# Patient Record
Sex: FEMALE | Race: WHITE | NOT HISPANIC OR LATINO | Employment: OTHER | URBAN - METROPOLITAN AREA
[De-identification: names, ages, dates, MRNs, and addresses within clinical notes are randomized per-mention and may not be internally consistent; named-entity substitution may affect disease eponyms.]

---

## 2017-03-20 ASSESSMENT — MIFFLIN-ST. JEOR: SCORE: 858.66

## 2017-03-23 ENCOUNTER — ANESTHESIA - HEALTHEAST (OUTPATIENT)
Dept: SURGERY | Facility: CLINIC | Age: 82
End: 2017-03-23

## 2017-03-24 ENCOUNTER — SURGERY - HEALTHEAST (OUTPATIENT)
Dept: SURGERY | Facility: CLINIC | Age: 82
End: 2017-03-24

## 2017-03-24 ENCOUNTER — HOME CARE/HOSPICE - HEALTHEAST (OUTPATIENT)
Dept: HOME HEALTH SERVICES | Facility: HOME HEALTH | Age: 82
End: 2017-03-24

## 2017-03-24 ASSESSMENT — MIFFLIN-ST. JEOR: SCORE: 858.66

## 2017-04-13 ENCOUNTER — COMMUNICATION - HEALTHEAST (OUTPATIENT)
Dept: ADMINISTRATIVE | Facility: CLINIC | Age: 82
End: 2017-04-13

## 2017-04-20 ENCOUNTER — AMBULATORY - HEALTHEAST (OUTPATIENT)
Dept: NURSING | Facility: CLINIC | Age: 82
End: 2017-04-20

## 2017-04-20 DIAGNOSIS — M81.0 SENILE OSTEOPOROSIS: ICD-10-CM

## 2017-10-25 ENCOUNTER — OFFICE VISIT - HEALTHEAST (OUTPATIENT)
Dept: INTERNAL MEDICINE | Facility: CLINIC | Age: 82
End: 2017-10-25

## 2017-10-25 DIAGNOSIS — M81.0 SENILE OSTEOPOROSIS: ICD-10-CM

## 2017-10-25 RX ORDER — LISINOPRIL 20 MG/1
20 TABLET ORAL DAILY
Status: SHIPPED | COMMUNITY
Start: 2017-10-25

## 2018-04-20 ENCOUNTER — AMBULATORY - HEALTHEAST (OUTPATIENT)
Dept: INTERNAL MEDICINE | Facility: CLINIC | Age: 83
End: 2018-04-20

## 2018-04-20 DIAGNOSIS — M81.0 SENILE OSTEOPOROSIS: ICD-10-CM

## 2018-04-26 ENCOUNTER — AMBULATORY - HEALTHEAST (OUTPATIENT)
Dept: NURSING | Facility: CLINIC | Age: 83
End: 2018-04-26

## 2018-10-30 ENCOUNTER — OFFICE VISIT - HEALTHEAST (OUTPATIENT)
Dept: INTERNAL MEDICINE | Facility: CLINIC | Age: 83
End: 2018-10-30

## 2018-10-30 DIAGNOSIS — M81.0 SENILE OSTEOPOROSIS: ICD-10-CM

## 2018-11-16 ENCOUNTER — RECORDS - HEALTHEAST (OUTPATIENT)
Dept: ADMINISTRATIVE | Facility: OTHER | Age: 83
End: 2018-11-16

## 2018-11-16 ENCOUNTER — RECORDS - HEALTHEAST (OUTPATIENT)
Dept: BONE DENSITY | Facility: CLINIC | Age: 83
End: 2018-11-16

## 2018-11-16 DIAGNOSIS — M81.0 AGE-RELATED OSTEOPOROSIS WITHOUT CURRENT PATHOLOGICAL FRACTURE: ICD-10-CM

## 2018-12-07 ENCOUNTER — COMMUNICATION - HEALTHEAST (OUTPATIENT)
Dept: INTERNAL MEDICINE | Facility: CLINIC | Age: 83
End: 2018-12-07

## 2018-12-10 ENCOUNTER — COMMUNICATION - HEALTHEAST (OUTPATIENT)
Dept: ENDOCRINOLOGY | Facility: CLINIC | Age: 83
End: 2018-12-10

## 2019-04-30 ENCOUNTER — AMBULATORY - HEALTHEAST (OUTPATIENT)
Dept: NURSING | Facility: CLINIC | Age: 84
End: 2019-04-30

## 2019-04-30 ENCOUNTER — COMMUNICATION - HEALTHEAST (OUTPATIENT)
Dept: INTERNAL MEDICINE | Facility: CLINIC | Age: 84
End: 2019-04-30

## 2019-10-30 ENCOUNTER — RECORDS - HEALTHEAST (OUTPATIENT)
Dept: SCHEDULING | Facility: CLINIC | Age: 84
End: 2019-10-30

## 2019-10-31 ENCOUNTER — OFFICE VISIT - HEALTHEAST (OUTPATIENT)
Dept: INTERNAL MEDICINE | Facility: CLINIC | Age: 84
End: 2019-10-31

## 2019-10-31 DIAGNOSIS — M81.0 SENILE OSTEOPOROSIS: ICD-10-CM

## 2019-10-31 RX ORDER — AMLODIPINE BESYLATE 5 MG/1
5 TABLET ORAL DAILY
Status: SHIPPED | COMMUNITY
Start: 2019-07-11 | End: 2020-07-10

## 2020-02-12 ENCOUNTER — AMBULATORY - HEALTHEAST (OUTPATIENT)
Dept: INTERNAL MEDICINE | Facility: CLINIC | Age: 85
End: 2020-02-12

## 2020-02-12 DIAGNOSIS — M81.0 SENILE OSTEOPOROSIS: ICD-10-CM

## 2020-02-12 DIAGNOSIS — Z92.29 PERSONAL HISTORY OF OTHER DRUG THERAPY: ICD-10-CM

## 2020-05-01 ENCOUNTER — AMBULATORY - HEALTHEAST (OUTPATIENT)
Dept: NURSING | Facility: CLINIC | Age: 85
End: 2020-05-01

## 2020-10-23 ENCOUNTER — AMBULATORY - HEALTHEAST (OUTPATIENT)
Dept: INTERNAL MEDICINE | Facility: CLINIC | Age: 85
End: 2020-10-23

## 2020-10-23 DIAGNOSIS — Z92.29 PERSONAL HISTORY OF OTHER DRUG THERAPY: ICD-10-CM

## 2020-10-23 DIAGNOSIS — M81.0 SENILE OSTEOPOROSIS: ICD-10-CM

## 2020-11-03 ENCOUNTER — OFFICE VISIT - HEALTHEAST (OUTPATIENT)
Dept: INTERNAL MEDICINE | Facility: CLINIC | Age: 85
End: 2020-11-03

## 2020-11-03 DIAGNOSIS — M81.0 SENILE OSTEOPOROSIS: ICD-10-CM

## 2020-11-03 RX ORDER — AMLODIPINE BESYLATE 5 MG/1
5 TABLET ORAL
Status: SHIPPED | COMMUNITY
Start: 2020-07-14 | End: 2021-07-14

## 2020-11-04 ENCOUNTER — COMMUNICATION - HEALTHEAST (OUTPATIENT)
Dept: INTERNAL MEDICINE | Facility: CLINIC | Age: 85
End: 2020-11-04

## 2020-11-04 LAB — 25(OH)D3 SERPL-MCNC: 52.9 NG/ML (ref 30–80)

## 2021-04-26 ENCOUNTER — RECORDS - HEALTHEAST (OUTPATIENT)
Dept: BONE DENSITY | Facility: CLINIC | Age: 86
End: 2021-04-26

## 2021-04-26 ENCOUNTER — RECORDS - HEALTHEAST (OUTPATIENT)
Dept: ADMINISTRATIVE | Facility: OTHER | Age: 86
End: 2021-04-26

## 2021-04-26 DIAGNOSIS — M81.0 AGE-RELATED OSTEOPOROSIS WITHOUT CURRENT PATHOLOGICAL FRACTURE: ICD-10-CM

## 2021-05-03 ENCOUNTER — COMMUNICATION - HEALTHEAST (OUTPATIENT)
Dept: INTERNAL MEDICINE | Facility: CLINIC | Age: 86
End: 2021-05-03

## 2021-05-04 ENCOUNTER — COMMUNICATION - HEALTHEAST (OUTPATIENT)
Dept: INTERNAL MEDICINE | Facility: CLINIC | Age: 86
End: 2021-05-04

## 2021-05-04 ENCOUNTER — AMBULATORY - HEALTHEAST (OUTPATIENT)
Dept: NURSING | Facility: CLINIC | Age: 86
End: 2021-05-04

## 2021-05-25 ENCOUNTER — RECORDS - HEALTHEAST (OUTPATIENT)
Dept: ADMINISTRATIVE | Facility: CLINIC | Age: 86
End: 2021-05-25

## 2021-05-26 ENCOUNTER — RECORDS - HEALTHEAST (OUTPATIENT)
Dept: ADMINISTRATIVE | Facility: CLINIC | Age: 86
End: 2021-05-26

## 2021-05-28 ENCOUNTER — RECORDS - HEALTHEAST (OUTPATIENT)
Dept: ADMINISTRATIVE | Facility: CLINIC | Age: 86
End: 2021-05-28

## 2021-05-28 NOTE — PROGRESS NOTES
"Prolia Injection Phone Screen      Screening questions have been asked 2-3 days prior to administration visit for Prolia. If any questions are answered with \"Yes,\" this phone encounter were will routed to ordering provider for further evaluation.     1.  When was the last injection?  10/30/18    2.  Has insurance for this injection been verified?  Yes    3.  Did you experience any new onset achiness or rashes that lasted for over a month with your previous Prolia injection?   No    4.  Do you have a fever over 101?F or a new deep cough that is unusual for you today? No    5.  Have you started any new medications in the last 6 months that you were told could affect your immune system? These may have been prescribed by oncologist, transplant, rheumatology, or dermatology.   No    6.  In the last 6 months have you have gastric bypass or parathyroid surgery?   No    7.  Do you plan dental work requiring drilling into the bone such as implants/extractions or oral surgery in the next 2-3 months?   No    8. Do you have new insurance since the last injection? no different plan.     Patient informed if symptoms discussed above present prior to their administration appointment, they are to notify clinic immediately.   The following steps were completed to comply with the REMS program for Prolia:  1. Ordering provider has previously reviewed information in the Medication Guide and Patient Counseling Chart, including the serious risks of Prolia  and the symptoms of each risk and have been advised to seek prompt medical attention if they have signs or symptoms of any of the serious risks.  2. Provided each patient a copy of the Medication Guide and Patient Brochure.  See MAR for administration details.   Indication: Prolia  (denosumab) is a prescription medicine used to treat osteoporosis in patients who:   Are at high risk for fracture, meaning patients who have had a fracture related to osteoporosis, or who have multiple risk " factors for fracture; Cannot use another osteoporosis medicine or other osteoporosis medicines did not work well.   The timeline for early/late injections would be 4 weeks early and any time after the 6 month marta. If a patient receives their injection late, then the subsequent injection would be 6 months from the date that they actually received the injection    Have the screening questions been asked prior to this administration? Yes    Mishel Castaneda CMA  9:44 AM  4/30/2019

## 2021-05-29 ENCOUNTER — RECORDS - HEALTHEAST (OUTPATIENT)
Dept: ADMINISTRATIVE | Facility: CLINIC | Age: 86
End: 2021-05-29

## 2021-05-30 VITALS — BODY MASS INDEX: 19.83 KG/M2 | WEIGHT: 105 LBS | HEIGHT: 61 IN

## 2021-05-31 VITALS — BODY MASS INDEX: 20.41 KG/M2 | WEIGHT: 108 LBS

## 2021-06-02 NOTE — PATIENT INSTRUCTIONS - HE
Prolia 13th today.  Prolia 14th in 6 months with my nurse. I will see you in 1 year.    DXA after 12/2020 .   Phone number to schedule [phone].    Daily calcium need is 2654-5012 mg a day from the diet and supplements.  Calcium citrate is easier to digest.  Vitamin D 2000 IU daily recommended.

## 2021-06-02 NOTE — PROGRESS NOTES
1. Osteoporosis Senile       Patient was educated on safety of Prolia utilizing Patient Counseling Chart for Healthcare Providers, as outlined by the Prolia REMS progam.     Return in about 6 months (around 4/30/2020) for Recheck.    Patient Instructions   Prolia 13th today.  Prolia 14th in 6 months with my nurse. I will see you in 1 year.    DXA after 12/2020 .   Phone number to schedule [phone].    Daily calcium need is 9244-5631 mg a day from the diet and supplements.  Calcium citrate is easier to digest.  Vitamin D 2000 IU daily recommended.      Chief Complaint   Patient presents with     Osteoporosis Follow Up       /70 (Patient Site: Right Arm, Patient Position: Sitting, Cuff Size: Adult Regular)   Pulse 80   Wt 110 lb (49.9 kg)   BMI 20.78 kg/m        Did you experience any problems with previous Prolia injection? no  Any medication change in the last 6 months? no  Did you take prednisone or other immunosupressant drugs in the last 6 months   (chemo, transplant, rheum, dermatology conditions)? no  Did you have any serious infection in the last 6 months?no  Any recent hospitalizations?no  Do you plan any dental work in the next 2-3 months?no  How much calcium do you take daily from the diet and supplements?1200 mg  How much vit D do you take daily? 2000 IU  Last DXA? 11/2018 Reviewed and discussed      Patient is here today for the 13th Prolia injection. Patient tolerated previous injections well.   We discussed calcium and vit D daily needs today.   Next Prolia injection will be in 6 months.     15 minutes spent with the patient and more then 50 % of the time in counseling.  This note has been dictated using voice recognition software. Any grammatical or context distortions are unintentional and inherent to the software      Patient Active Problem List   Diagnosis     Essential Hypercholesterolemia     Congenital Thrombocytopenia     Benign Essential Hypertension     Osteoporosis Senile      Hip fracture (H)     HTN (hypertension)     Dyslipidemia     Intertrochanteric fracture of left femur (H)     Acute blood loss anemia       Current Outpatient Medications on File Prior to Visit   Medication Sig Dispense Refill     amLODIPine (NORVASC) 5 MG tablet Take 5 mg by mouth daily.       cholecalciferol, vitamin D3, 1,000 unit tablet Take 2,000 Units by mouth daily.        denosumab (PROLIA) 60 mg/mL Syrg Inject 60 mg under the skin every 6 (six) months.        glucosamine-chondroitin 500-400 mg cap Take 1 capsule by mouth daily.       lisinopril (PRINIVIL,ZESTRIL) 20 MG tablet Take 20 mg by mouth daily.       MULTIVITS W-FE,OTHER MIN/LUT (CENTRUM SILVER ULTRA WOMEN'S ORAL) Take 1 tablet by mouth daily.       omega-3s-dha-epa-fish oil (FISH OIL) 120 mg-180 mg- 60 mg-1,200 mg CpDR Take 1 capsule by mouth daily.       simvastatin (ZOCOR) 10 MG tablet Take 10 mg by mouth bedtime.        [DISCONTINUED] aspirin 325 MG tablet Take 1 tablet (325 mg total) by mouth 2 (two) times a day with meals. Take for 30 days after surgery. 60 tablet 0     [DISCONTINUED] aspirin 81 MG EC tablet Take 81 mg by mouth daily.       [DISCONTINUED] FLUZONE HIGH-DOSE 2017-18, PF, 180 mcg/0.5 mL Syrg injection ADM 0.5ML IM UTD  0     Current Facility-Administered Medications on File Prior to Visit   Medication Dose Route Frequency Provider Last Rate Last Dose     [DISCONTINUED] denosumab 60 mg (PROLIA 60 mg/ml)  60 mg Subcutaneous Q6 Months Douglas Jade MD   60 mg at 04/26/18 1008     [DISCONTINUED] denosumab 60 mg (PROLIA 60 mg/ml)  60 mg Subcutaneous Q6 Months Douglas Jade MD   60 mg at 04/30/19 3246

## 2021-06-03 VITALS
DIASTOLIC BLOOD PRESSURE: 70 MMHG | SYSTOLIC BLOOD PRESSURE: 144 MMHG | HEART RATE: 80 BPM | BODY MASS INDEX: 20.78 KG/M2 | WEIGHT: 110 LBS

## 2021-06-05 VITALS — HEART RATE: 94 BPM | OXYGEN SATURATION: 99 % | DIASTOLIC BLOOD PRESSURE: 74 MMHG | SYSTOLIC BLOOD PRESSURE: 138 MMHG

## 2021-06-07 NOTE — PROGRESS NOTES
"Prolia Injection Phone Screen      Screening questions have been asked 2-3 days prior to administration visit for Prolia. If any questions are answered with \"Yes,\" this phone encounter were will routed to ordering provider for further evaluation.     1.  When was the last injection?  10/31/19    2.  Has insurance for this injection been verified?  Yes    3.  Did you experience any new onset achiness or rashes that lasted for over a month with your previous Prolia injection?   No    4.  Do you have a fever over 101?F or a new deep cough that is unusual for you today? No    5.  Have you started any new medications in the last 6 months that you were told could affect your immune system? These may have been prescribed by oncologist, transplant, rheumatology, or dermatology.   No    6.  In the last 6 months have you have gastric bypass or parathyroid surgery?   No    7.  Do you plan dental work requiring drilling into the bone such as implants/extractions or oral surgery in the next 2-3 months?   No    8. Do you have new insurance since the last injection? no    Patient informed if symptoms discussed above present prior to their administration appointment, they are to notify clinic immediately.     Emily Thomas          The following steps were completed to comply with the REMS program for Prolia:  1. Ordering provider has previously reviewed information in the Medication Guide and Patient Counseling Chart, including the serious risks of Prolia  and the symptoms of each risk and have been advised to seek prompt medical attention if they have signs or symptoms of any of the serious risks.  2. Provided each patient a copy of the Medication Guide and Patient Brochure.  See MAR for administration details.   Indication: Prolia  (denosumab) is a prescription medicine used to treat osteoporosis in patients who:   Are at high risk for fracture, meaning patients who have had a fracture related to osteoporosis, or who have " multiple risk factors for fracture; Cannot use another osteoporosis medicine or other osteoporosis medicines did not work well.   The timeline for early/late injections would be 4 weeks early and any time after the 6 month marta. If a patient receives their injection late, then the subsequent injection would be 6 months from the date that they actually received the injection    Have the screening questions been asked prior to this administration? Yes     Patient consents to receive outdoor care: Yes    Upon arrival, patient instructed to proceed to designated location, place vehicle in park, turn off, and remove keys  and Patient receiving an immunization or injection. Instructed patient to notify healthcare personnel if they are having an adverse reaction.    If we are unable to safely and ergonomically able to provide care- is the patient able to safely able to get out of car and transfer to a chair? Yes

## 2021-06-09 NOTE — ANESTHESIA CARE TRANSFER NOTE
Last vitals:   Vitals:    03/24/17 0944   BP: 109/51   Pulse: 87   Resp: 16   Temp: 36.3  C (97.3  F)   SpO2: 100%     Patient's level of consciousness is drowsy  Spontaneous respirations: yes  Maintains airway independently: yes  Dentition unchanged: yes  Oropharynx: oropharynx clear of all foreign objects    QCDR Measures:  ASA# 20 - Surgical Safety Checklist: ASA20A - Safety Checks Done  PQRS# 430 - Adult PONV Prevention: 4558F - Pt received => 2 anti-emetic agents (different classes) preop & intraop  ASA# 8 - Peds PONV Prevention: NA - Not pediatric patient, not GA or 2 or more risk factors NOT present  PQRS# 424 - Anay-op Temp Management: 4559F - At least one body temp DOCUMENTED => 35.5C or 95.9F within required timeframe  PQRS# 426 - PACU Transfer Protocol: - Transfer of care checklist used  ASA# 14 - Acute Post-op Pain: ASA14B - Patient did NOT experience pain >= 7 out of 10    I completed my SBAR handoff to the receiving nurse per policy and procedure.

## 2021-06-09 NOTE — ANESTHESIA PROCEDURE NOTES
Spinal Block    Patient location during procedure: OR  Start time: 3/24/2017 7:33 AM  End time: 3/24/2017 7:36 AM  Reason for block: primary anesthetic    Staffing:  Performing  Anesthesiologist: MACIEL SU    Preanesthetic Checklist  Completed: patient identified, risks, benefits, and alternatives discussed, timeout performed, consent obtained, airway assessed, oxygen available, suction available, emergency drugs available and hand hygiene performed  Spinal Block  Patient position: sitting  Prep: ChloraPrep  Patient monitoring: heart rate, cardiac monitor, continuous pulse ox and blood pressure  Approach: midline  Location: L3-4  Injection technique: single-shot  Needle type: pencil-tip   Needle gauge: 24 G

## 2021-06-09 NOTE — ANESTHESIA POSTPROCEDURE EVALUATION
Patient: Eli Hale  LEFT JACKIE GAMMA NAIL REMOVAL WITH,  LEFT COMPLEX TOTAL HIP ARTHROPLASTY  Anesthesia type: spinal    Patient location: PACU  Last vitals:   Vitals:    03/24/17 1100   BP: 125/63   Pulse: 82   Resp: 16   Temp: 36.8  C (98.2  F)   SpO2: 98%     Post vital signs: stable  Level of consciousness: awake and responds to simple questions  Post-anesthesia pain: pain controlled  Post-anesthesia nausea and vomiting: no  Pulmonary: unassisted, return to baseline  Cardiovascular: stable and blood pressure at baseline  Hydration: adequate  Anesthetic events: no    QCDR Measures:  ASA# 11 - Anay-op Cardiac Arrest: ASA11B - Patient did NOT experience unanticipated cardiac arrest  ASA# 12 - Anay-op Mortality Rate: ASA12B - Patient did NOT die  ASA# 13 - PACU Re-Intubation Rate: No ETT or LMA used  ASA# 10 - Composite Anes Safety: ASA10A - No serious adverse event  ASA# 38 - New Corneal Injury: ASA38A - No new exposure keratitis or corneal abrasion in PACU    Additional Notes:

## 2021-06-09 NOTE — ANESTHESIA PREPROCEDURE EVALUATION
Anesthesia Evaluation      Patient summary reviewed   No history of anesthetic complications     Airway   Mallampati: II  Neck ROM: full   Pulmonary - negative ROS and normal exam    breath sounds clear to auscultation                         Cardiovascular - negative ROS and normal exam  Exercise tolerance: > or = 4 METS  (+) hypertension well controlled, ,     ECG reviewed  Rhythm: regular  Rate: normal,         Neuro/Psych - negative ROS     Endo/Other - negative ROS      GI/Hepatic/Renal - negative ROS           Dental - normal exam                        Anesthesia Plan  Planned anesthetic: spinal  GA as back-up  ASA 2     Anesthetic plan and risks discussed with: patient    Post-op plan: routine recovery

## 2021-06-10 NOTE — PROGRESS NOTES
Chief Complaint   Patient presents with     Prolia #8     1. Did you experience any problems with previous Prolia injection? NO  2. Do you feel sick today?(fever, RS, GI,  issues)? NO  3. Any medication changes in the last 6 months? NO  4. Did you take prednisone or other immunosuppressant drugs in the last 6 months?(chemo, transplant, rheu, dermatology conditions)? NO  5. Did you have any serious infection in the last 6 months? (pancreatitis) NO  6. Any recent hospitalizations/ surgeries (especially gastric bypass, thyroid, parathyroid)? YES 3/24/17 AT [facility] fracture of left femur  7. Do you plan any dental work?(especially implants and extractions) in the next 2-3 months? NO   8. Did you have any fractures in the last year?  YES fracture of left femur    Yearly F/U appointment  with Dr. Jade is made.     Kelle Vergara CMA [facility] 4/20/2017 9:35 AM

## 2021-06-12 NOTE — PROGRESS NOTES
1. Osteoporosis Senile  DXA Bone Density Scan    Vitamin D, Total (25-Hydroxy)     Patient was educated on safety of Prolia utilizing Patient Counseling Chart for Healthcare Providers, as outlined by the Prolia REMS progam.     Return in about 6 months (around 5/3/2021) for Prolia injection.    Patient Instructions   Prolia 15th today.  Prolia 16th in 6 months with my nurse. I will see you in 1 year.    DXA due in Dec 2020 .   Phone number to schedule [phone].    Daily calcium need is 7754-2978 mg a day from the diet and supplements.  Calcium citrate is easier to digest.  Vitamin D 2000 IU daily recommended.      Chief Complaint   Patient presents with     Osteoporosis Follow Up     Osteo F/U       /74   Pulse 94   SpO2 99%       Did you experience any problems with previous Prolia injection? no  Any medication change in the last 6 months? no  Did you take prednisone or other immunosupressant drugs in the last 6 months   (chemo, transplant, rheum, dermatology conditions)? no  Did you have any serious infection in the last 6 months?no  Any recent hospitalizations?no  Do you plan any dental work in the next 2-3 months?no  How much calcium do you take daily from the diet and supplements?1200 mg  How much vit D do you take daily? 4000 IU  Last DXA? 12/2018 Reviewed and discussed      Patient is here today for the 15th Prolia injection. Patient tolerated previous injections well.   We discussed calcium and vit D daily needs today.   Labs reviewed:  automated decision support.    Ref Range & Units 7/14/20 0904 Resulting Agency   Sodium 136 - 145 mmol/L 138  [facility]   Potassium 3.5 - 5.1 mmol/L 4.1  [facility]   Chloride 98 - 109 mmol/L 102  [facility]   CO2 20 - 29 mmol/L 30High   [facility]   Anion Gap 7 - 16 mmol/L 6Low   [facility]   Calcium 8.4 - 10.4 mg/dL 9.5  [facility]   BUN 7 - 26 mg/dL 16   [facility]   Creatinine 0.55 - 1.02 mg/dL 0.73         Next Prolia injection will be in 6 months.     Patient was educated on safety of Prolia utilizing Patient Counseling Chart for Healthcare Providers, as outlined by the Prolia REMS progam.     15 minutes spent with the patient and more then 50 % of the time in counseling about osteoporosis, available osteoporosis treatment options, medication side effects and contraindications, supplement use and weightbearing exercise.    This note has been dictated using voice recognition software. Any grammatical or context distortions are unintentional and inherent to the software      Patient Active Problem List   Diagnosis     Essential Hypercholesterolemia     Congenital Thrombocytopenia     Benign Essential Hypertension     Osteoporosis Senile     Hip fracture (H)     HTN (hypertension)     Dyslipidemia     Intertrochanteric fracture of left femur (H)     Acute blood loss anemia       Current Outpatient Medications on File Prior to Visit   Medication Sig Dispense Refill     amLODIPine (NORVASC) 5 MG tablet Take 5 mg by mouth.       cholecalciferol, vitamin D3, 1,000 unit tablet Take 2,000 Units by mouth daily.        glucosamine-chondroitin 500-400 mg cap Take 1 capsule by mouth daily.       lisinopril (PRINIVIL,ZESTRIL) 20 MG tablet Take 20 mg by mouth daily.       MULTIVITS W-FE,OTHER MIN/LUT (CENTRUM SILVER ULTRA WOMEN'S ORAL) Take 1 tablet by mouth daily.       omega-3s-dha-epa-fish oil (FISH OIL) 120 mg-180 mg- 60 mg-1,200 mg CpDR Take 1 capsule by mouth daily.       simvastatin (ZOCOR) 10 MG tablet Take 10 mg by mouth bedtime.        [DISCONTINUED] denosumab (PROLIA) 60 mg/mL Syrg Inject 60 mg under the skin every 6 (six) months.        amLODIPine (NORVASC) 5 MG tablet Take 5 mg by mouth daily.       Current Facility-Administered Medications on File Prior to Visit   Medication Dose Route Frequency Provider Last Rate Last Dose     denosumab 60 mg  (PROLIA 60 mg/ml)  60 mg Subcutaneous Q6 Months Douglas Jade MD   60 mg at 05/01/20 0926     denosumab 60 mg (PROLIA 60 mg/ml)  60 mg Subcutaneous Q6 Months Douglas Jade MD         [START ON 11/6/2020] denosumab 60 mg (PROLIA 60 mg/ml)  60 mg Subcutaneous Q6 Months Douglas Jade MD

## 2021-06-12 NOTE — PATIENT INSTRUCTIONS - HE
Prolia 15th today.  Prolia 16th in 6 months with my nurse. I will see you in 1 year.    DXA due in Dec 2020 .   Phone number to schedule [phone].    Daily calcium need is 8547-3320 mg a day from the diet and supplements.  Calcium citrate is easier to digest.  Vitamin D 2000 IU daily recommended.

## 2021-06-13 NOTE — PROGRESS NOTES
1. Osteoporosis Senile  DXA Bone Density Scan       Return in about 6 months (around 4/25/2018) for Recheck.    Patient Instructions   Prolia 8th today.  Prolia 9th in 6 months with my nurse. I will see you in 1 year.  DXA in 9/2018 .   Phone number to schedule [phone].  Please avoid any extensive dental work as implants and teeth extractions for the next 1-2 months.  Daily calcium need is 8544-2901 mg a day from the diet and supplements.  Calcium citrate is easier to digest.  Vitamin D 2000 IU daily recommended.        Chief Complaint   Patient presents with     Osteoporosis Follow Up       /72  Pulse 72  Wt 108 lb (49 kg)  BMI 20.41 kg/m2      Did you experience any problems with previous Prolia injection? no  Any medication change in the last 6 months? no  Did you take prednisone or other immunosupressant drugs in the last 6 months   (chemo, transplant, rheum, dermatology conditions)? no  Did you have any serious infection in the last 6 months?no  Any recent hospitalizations?no  Do you plan any dental work in the next 2-3 months?no  How much calcium do you take daily from the diet and supplements?1200 mg  How much vit D do you take daily? 2000 IU  Last DXA? 9/2016      Patient is here today for the 8th Prolia injection. Patient tolerated previous injections well.   We discussed calcium and vit D daily needs today.   Next Prolia injection will be in 6 months.     15 minutes spent with the patient and more then 50 % of the time in counseling.  This note has been dictated using voice recognition software. Any grammatical or context distortions are unintentional and inherent to the software      Patient Active Problem List   Diagnosis     Essential Hypercholesterolemia     Congenital Thrombocytopenia     Benign Essential Hypertension     Osteoporosis Senile     Hip fracture     HTN (hypertension)     Dyslipidemia     Intertrochanteric fracture of left femur     Acute blood loss anemia       Current  Outpatient Prescriptions on File Prior to Visit   Medication Sig Dispense Refill     aspirin 325 MG tablet Take 1 tablet (325 mg total) by mouth 2 (two) times a day with meals. Take for 30 days after surgery. 60 tablet 0     cholecalciferol, vitamin D3, 1,000 unit tablet Take 2,000 Units by mouth daily.        denosumab (PROLIA) 60 mg/mL Syrg Inject 60 mg under the skin every 6 (six) months.        glucosamine-chondroitin 500-400 mg cap Take 1 capsule by mouth daily.       MULTIVITS W-FE,OTHER MIN/LUT (CENTRUM SILVER ULTRA WOMEN'S ORAL) Take 1 tablet by mouth daily.       omega-3s-dha-epa-fish oil (FISH OIL) 120 mg-180 mg- 60 mg-1,200 mg CpDR Take 1 capsule by mouth daily.       simvastatin (ZOCOR) 10 MG tablet Take 10 mg by mouth bedtime.        [DISCONTINUED] melatonin 3 mg Tab tablet Take 1 tablet (3 mg total) by mouth at bedtime as needed.  0     [DISCONTINUED] omeprazole (PRILOSEC) 20 MG capsule Take 1 capsule (20 mg total) by mouth 2 (two) times a day before meals.  0     [DISCONTINUED] senna-docusate (PERICOLACE) 8.6-50 mg tablet Take 1 tablet by mouth 2 (two) times a day. Hold with loose stools. 30 tablet 0     No current facility-administered medications on file prior to visit.

## 2021-06-15 PROBLEM — D62 ACUTE BLOOD LOSS ANEMIA: Status: ACTIVE | Noted: 2017-03-25

## 2021-06-15 PROBLEM — S72.142A INTERTROCHANTERIC FRACTURE OF LEFT FEMUR (H): Status: ACTIVE | Noted: 2017-03-24

## 2021-06-17 NOTE — TELEPHONE ENCOUNTER
Patient came in for prolia shot #16 today. She had a view questions regarding her last DXA report. Wants to know -     1. Is she stable/better/worse??  2. How long will she need to be on the shot?  3. Is there a certain amount of time until you are taken off the shot?      Ok to leave detailed message on home number     Assessment:     1. The spine bone density L1-L4 was not done because of the significant artifacts.  2. Femoral bone densities show right total hip T-score -2.8, with no statistically significant change compared to the previous DXA scan done in 2018.  3. Left forearm bone density with T-score -3.2.

## 2021-06-17 NOTE — TELEPHONE ENCOUNTER
Pt is coming in for a Prolia tomorrow but the referral says pending from 10/2020. Please check on this. Thanks.

## 2021-06-17 NOTE — PROGRESS NOTES
"Prolia Injection Phone Screen      Screening questions have been asked 2-3 days prior to administration visit for Prolia. If any questions are answered with \"Yes,\" this phone encounter were will routed to ordering provider for further evaluation.     1.  When was the last injection?  11/3/2020    2.  Has insurance for this injection been verified?  Yes    3.  Did you experience any new onset achiness or rashes that lasted for over a month with your previous Prolia injection?   No    4.  Do you have a fever over 101?F or a new deep cough that is unusual for you today? No    5.  Have you started any new medications in the last 6 months that you were told could affect your immune system? These may have been prescribed by oncologist, transplant, rheumatology, or dermatology.   No    6.  In the last 6 months have you have gastric bypass or parathyroid surgery?   No    7.  Do you plan dental work requiring drilling into the bone such as implants/extractions or oral surgery in the next 2-3 months?   No    8. Do you have new insurance since the last injection?    9. Have you received the Covid-19 vaccine? Yes  If No - Proceed with Prolia injection  If Yes - Date of vaccination 3/3/2021. Will need to wait until 2 weeks after 2nd dose of Covid-19 vaccine before administering Prolia       Patient informed if symptoms discussed above present prior to their administration appointment, they are to notify clinic immediately.     Tg Guthrie     The following steps were completed to comply with the REMS program for Prolia:  1. Ordering provider has previously reviewed information in the Medication Guide and Patient Counseling Chart, including the serious risks of Prolia  and the symptoms of each risk and have been advised to seek prompt medical attention if they have signs or symptoms of any of the serious risks.  2. Provided each patient a copy of the Medication Guide and Patient Brochure.  See MAR for administration " details.   Indication: Prolia  (denosumab) is a prescription medicine used to treat osteoporosis in patients who:   Are at high risk for fracture, meaning patients who have had a fracture related to osteoporosis, or who have multiple risk factors for fracture; Cannot use another osteoporosis medicine or other osteoporosis medicines did not work well.   The timeline for early/late injections would be 4 weeks early and any time after the 6 month marta. If a patient receives their injection late, then the subsequent injection would be 6 months from the date that they actually received the injection    Have the screening questions been asked prior to this administration? Yes

## 2021-06-17 NOTE — TELEPHONE ENCOUNTER
The bone density test showed stable osteoporosis with no decline. Stability considers positive response to the Prolia treatment. We will continue Prolia as long as she tolerates it well. I would not recommend stopping Prolia. She should see me once a year.

## 2021-06-17 NOTE — PROGRESS NOTES
Chief Complaint   Patient presents with     Prolia #9     1. Did you experience any problems with previous Prolia injection? no  2. Do you feel sick today?(fever, RS, GI,  issues)? no  3. Any medication changes in the last 6 months? no  4. Did you take prednisone or other immunosuppressant drugs in the last 6 months?(chemo, transplant, rheu, dermatology conditions)? no  5. Did you have any serious infection in the last 6 months? (pancreatitis) no  6. Any recent hospitalizations/ surgeries (especially gastric bypass, thyroid, parathyroid)? no  7. Do you plan any dental work?(especially implants and extractions) in the next 2-3 months? Yes, next month for filling, per Dr. Graham it is okay to get Prolia   8. Did you have any fractures in the last year? No    Osteo number is given to the patient. Informed patient schedule for next 6 months Prolia injection with Dr. Jade and Dexa . Patient verbalized understanding     Kelle Vergara CMA [facility] 4/26/2018 10:07 AM

## 2021-06-17 NOTE — TELEPHONE ENCOUNTER
"Prolia Injection Phone Screen      Screening questions have been asked 2-3 days prior to administration visit for Prolia. If any questions are answered with \"Yes,\" this phone encounter were will routed to ordering provider for further evaluation.     1.  When was the last injection?  11/3/2020    2.  Has insurance for this injection been verified?  Yes    3.  Did you experience any new onset achiness or rashes that lasted for over a month with your previous Prolia injection?   No    4.  Do you have a fever over 101?F or a new deep cough that is unusual for you today? No    5.  Have you started any new medications in the last 6 months that you were told could affect your immune system? These may have been prescribed by oncologist, transplant, rheumatology, or dermatology.   No    6.  In the last 6 months have you have gastric bypass or parathyroid surgery?   No    7.  Do you plan dental work requiring drilling into the bone such as implants/extractions or oral surgery in the next 2-3 months?   No    Patient informed if symptoms discussed above present prior to their administration appointment, they are to notify clinic immediately.     Kelle Horne            "

## 2021-06-21 NOTE — LETTER
Letter by Douglas Jade MD at      Author: Douglas Jade MD Service: -- Author Type: --    Filed:  Encounter Date: 11/4/2020 Status: (Other)         Eli Hale  [address] [facility] [id number]             November 4, 2020         Dear Ms. Hale,    Below are the results from your recent visit:    Resulted Orders   Vitamin D, Total (25-Hydroxy)   Result Value Ref Range    Vitamin D, Total (25-Hydroxy) 52.9 30.0 - 80.0 ng/mL    Narrative    Deficiency <10.0 ng/mL  Insufficiency 10.0-29.9 ng/mL  Sufficiency 30.0-80.0 ng/mL  Toxicity (possible) >100.0 ng/mL       Vit D level is good. You can continue same vit D daily dose.    Please call with questions or contact us using [facility].    Sincerely,        Electronically signed by Douglas Jade MD

## 2021-06-21 NOTE — PROGRESS NOTES
1. Osteoporosis Senile         Return in about 6 months (around 4/30/2019) for Prolia injection.    Patient Instructions   Prolia 11th today.  Prolia 12th in 6 months. I will see you in 1 year.    DXA - due now .   Phone number to schedule [phone].    Daily calcium need is 8174-8079 mg a day from the diet and supplements.  Calcium citrate is easier to digest.  Vitamin D 2000 IU daily recommended.      Chief Complaint   Patient presents with     Osteoporosis Follow Up       /80 (Patient Site: Right Arm, Patient Position: Sitting, Cuff Size: Adult Regular)  Pulse 76  Resp 16      Did you experience any problems with previous Prolia injection? no  Any medication change in the last 6 months? no  Did you take prednisone or other immunosupressant drugs in the last 6 months   (chemo, transplant, rheum, dermatology conditions)? no  Did you have any serious infection in the last 6 months?no  Any recent hospitalizations?no  Do you plan any dental work in the next 2-3 months?no  How much calcium do you take daily from the diet and supplements?1200 mg  How much vit D do you take daily? 2000 IU  Last DXA? 2016, Reviewed and discussed      Patient is here today for the 11th Prolia injection. Patient tolerated previous injections well.   We discussed calcium and vit D daily needs today.   Next Prolia injection will be in 6 months.     15 minutes spent with the patient and more then 50 % of the time in counseling.  This note has been dictated using voice recognition software. Any grammatical or context distortions are unintentional and inherent to the software      Patient Active Problem List   Diagnosis     Essential Hypercholesterolemia     Congenital Thrombocytopenia     Benign Essential Hypertension     Osteoporosis Senile     Hip fracture (H)     HTN (hypertension)     Dyslipidemia     Intertrochanteric fracture of left femur (H)     Acute blood loss anemia       Current Outpatient Prescriptions on File Prior to  Visit   Medication Sig Dispense Refill     aspirin 325 MG tablet Take 1 tablet (325 mg total) by mouth 2 (two) times a day with meals. Take for 30 days after surgery. 60 tablet 0     aspirin 81 MG EC tablet Take 81 mg by mouth daily.       cholecalciferol, vitamin D3, 1,000 unit tablet Take 2,000 Units by mouth daily.        denosumab (PROLIA) 60 mg/mL Syrg Inject 60 mg under the skin every 6 (six) months.        FLUZONE HIGH-DOSE 2017-18, PF, 180 mcg/0.5 mL Syrg injection ADM 0.5ML IM UTD  0     glucosamine-chondroitin 500-400 mg cap Take 1 capsule by mouth daily.       lisinopril (PRINIVIL,ZESTRIL) 20 MG tablet Take 20 mg by mouth daily.       MULTIVITS W-FE,OTHER MIN/LUT (CENTRUM SILVER ULTRA WOMEN'S ORAL) Take 1 tablet by mouth daily.       omega-3s-dha-epa-fish oil (FISH OIL) 120 mg-180 mg- 60 mg-1,200 mg CpDR Take 1 capsule by mouth daily.       simvastatin (ZOCOR) 10 MG tablet Take 10 mg by mouth bedtime.        Current Facility-Administered Medications on File Prior to Visit   Medication Dose Route Frequency Provider Last Rate Last Dose     denosumab 60 mg (PROLIA 60 mg/ml)  60 mg Subcutaneous Q6 Months Douglas Jade MD   60 mg at 04/26/18 [id number]

## 2021-07-02 ENCOUNTER — TRANSCRIBE ORDERS (OUTPATIENT)
Dept: INTERNAL MEDICINE | Facility: CLINIC | Age: 86
End: 2021-07-02

## 2021-07-02 DIAGNOSIS — M81.0 SENILE OSTEOPOROSIS: Primary | ICD-10-CM

## 2021-07-02 DIAGNOSIS — Z92.29 PERSONAL HISTORY OF OTHER DRUG THERAPY: ICD-10-CM

## 2021-07-02 NOTE — PROGRESS NOTES
As part of the required manual data conversion process for integration, this encounter was created to document a CAM (Clinic Administered Medication) order. This information was copied from the [facility] patient's chart to the [facility] patient chart.     Griselda Quiñones, Blank  July 2, 2021

## 2021-07-03 NOTE — ADDENDUM NOTE
Addendum Note by Zahida Nelson CMA at 10/30/2018  2:47 PM     Author: Zahida Nelson CMA Service: -- Author Type: Medical Assistant    Filed: 10/30/2018  2:47 PM Encounter Date: 10/30/2018 Status: Signed    : Zahida Nelson CMA (Certified Medical Assistant)    Addended by: ZAHIDA NELSON on: 10/30/2018 02:47 PM        Modules accepted: Orders

## 2021-07-03 NOTE — ADDENDUM NOTE
Addendum Note by Zahida Nelson CMA at 10/31/2019  1:20 PM     Author: Zahida Nelson CMA Service: -- Author Type: Certified Medical Assistant    Filed: 10/31/2019  1:54 PM Encounter Date: 10/31/2019 Status: Signed    : Zahida Nelson CMA (Certified Medical Assistant)    Addended by: ZAHIDA NELSON on: 10/31/2019 01:54 PM        Modules accepted: Orders

## 2021-07-03 NOTE — ADDENDUM NOTE
Addendum Note by Bloch, Lisa M, CMA at 10/25/2017  4:41 PM     Author: Bloch, Lisa M, CMA Service: -- Author Type: Certified Medical Assistant    Filed: 10/25/2017  4:41 PM Encounter Date: 10/25/2017 Status: Signed    : Bloch, Lisa M, CMA (Certified Medical Assistant)    Addended by: BLOCH, LISA M on: 10/25/2017 04:41 PM        Modules accepted: Orders

## 2021-11-09 ENCOUNTER — OFFICE VISIT (OUTPATIENT)
Dept: INTERNAL MEDICINE | Facility: CLINIC | Age: 86
End: 2021-11-09
Payer: COMMERCIAL

## 2021-11-09 VITALS
OXYGEN SATURATION: 97 % | BODY MASS INDEX: 20.78 KG/M2 | DIASTOLIC BLOOD PRESSURE: 62 MMHG | SYSTOLIC BLOOD PRESSURE: 135 MMHG | HEART RATE: 84 BPM | WEIGHT: 110 LBS

## 2021-11-09 DIAGNOSIS — S32.030D CLOSED COMPRESSION FRACTURE OF L3 LUMBAR VERTEBRA WITH ROUTINE HEALING, SUBSEQUENT ENCOUNTER: ICD-10-CM

## 2021-11-09 DIAGNOSIS — M81.0 SENILE OSTEOPOROSIS: Primary | ICD-10-CM

## 2021-11-09 DIAGNOSIS — S72.145D CLOSED NONDISPLACED INTERTROCHANTERIC FRACTURE OF LEFT FEMUR WITH ROUTINE HEALING, SUBSEQUENT ENCOUNTER: ICD-10-CM

## 2021-11-09 PROBLEM — S32.030A CLOSED COMPRESSION FRACTURE OF L3 VERTEBRA (H): Status: ACTIVE | Noted: 2021-11-09

## 2021-11-09 PROCEDURE — 99213 OFFICE O/P EST LOW 20 MIN: CPT | Mod: 25 | Performed by: INTERNAL MEDICINE

## 2021-11-09 PROCEDURE — 96372 THER/PROPH/DIAG INJ SC/IM: CPT | Performed by: INTERNAL MEDICINE

## 2021-11-09 NOTE — PROGRESS NOTES
(M81.0) Osteoporosis Senile  (primary encounter diagnosis)      Patient was educated on safety of Prolia utilizing Patient Counseling Chart for Healthcare Providers, as outlined by the Prolia REMS progam.     Return in about 6 months (around 5/9/2022) for Prolia.    Patient Instructions   Prolia 17th today.  Prolia 18th in 6 months with my nurse. I will see you in 1 year.    DXA in 4/2023 .   Phone number to schedule [phone].    Daily calcium need is 6024-7789 mg a day from the diet and supplements.  Calcium citrate is easier to digest.  Vitamin D 2000 IU daily recommended.          /62   Pulse 84   Wt 49.9 kg (110 lb)   SpO2 97%   BMI 20.78 kg/m        Did you experience any problems with previous Prolia injection? no  Any medication change in the last 6 months? no  Did you take prednisone or other immunosupressant drugs in the last 6 months   (chemo, transplant, rheum, dermatology conditions)? no  Did you have any serious infection in the last 6 months?no  Any recent hospitalizations?no  Do you plan any dental work in the next 2-3 months?no  How much calcium do you take daily from the diet and supplements?1200 mg  How much vit D do you take daily? 2000 IU  Last DXA? 4/2021, Reviewed and discussed      Patient is here today for the 17th Prolia injection. Patient tolerated previous injections well.   We discussed calcium and vit D daily needs today.   I reviewed the lab results:  Sodium 136 - 145 mmol/L 139  [facility]   Potassium 3.5 - 5.1 mmol/L 4.3  [facility]   Chloride 98 - 109 mmol/L 103  [facility]   CO2 20 - 29 mmol/L 26  [facility]   Anion Gap 7 - 16 mmol/L 10  [facility]   Calcium 8.4 - 10.4 mg/dL 9.3  [facility]   BUN 7 - 26 mg/dL 16  [facility]   Creatinine 0.55 - 1.02 mg/dL 0.75  [facility]   GFR, Estimated >60 mL/min/1.73m2 >60               Next Prolia  injection will be in 6 months.           This note has been dictated using voice recognition software. Any grammatical or context distortions are unintentional and inherent to the software      Patient Active Problem List   Diagnosis     Essential Hypercholesterolemia     Congenital Thrombocytopenia     Benign Essential Hypertension     Osteoporosis Senile     Hip fracture (H)     HTN (hypertension)     Dyslipidemia     Intertrochanteric fracture of left femur (H)     Acute blood loss anemia       Current Outpatient Medications   Medication     calcium gluconate 500 MG tablet     cholecalciferol, vitamin D3, 1,000 unit tablet     glucosamine-chondroitin 500-400 mg cap     lisinopril (PRINIVIL,ZESTRIL) 20 MG tablet     MULTIVITS W-FE,OTHER MIN/LUT (CENTRUM SILVER ULTRA WOMEN'S ORAL)     omega-3s-dha-epa-fish oil (FISH OIL) 120 mg-180 mg- 60 mg-1,200 mg CpDR     simvastatin (ZOCOR) 10 MG tablet     amLODIPine (NORVASC) 5 MG tablet     Current Facility-Administered Medications   Medication     denosumab (PROLIA) injection 60 mg

## 2021-11-09 NOTE — PATIENT INSTRUCTIONS
Prolia 17th today.  Prolia 18th in 6 months with my nurse. I will see you in 1 year.    DXA in 4/2023 .   Phone number to schedule [phone].    Daily calcium need is 5123-5549 mg a day from the diet and supplements.  Calcium citrate is easier to digest.  Continue same vit D pills.

## 2022-05-09 ENCOUNTER — ALLIED HEALTH/NURSE VISIT (OUTPATIENT)
Dept: FAMILY MEDICINE | Facility: CLINIC | Age: 87
End: 2022-05-09
Payer: COMMERCIAL

## 2022-05-09 DIAGNOSIS — M81.0 OSTEOPOROSIS: Primary | ICD-10-CM

## 2022-05-09 PROCEDURE — 99207 PR NO CHARGE NURSE ONLY: CPT

## 2022-05-09 PROCEDURE — 96372 THER/PROPH/DIAG INJ SC/IM: CPT | Performed by: PHARMACIST

## 2022-05-09 NOTE — PROGRESS NOTES
"Prolia Injection Phone Screen      Screening questions have been asked 2-3 days prior to administration visit for Prolia. If any questions are answered with \"Yes,\" this phone encounter were will routed to ordering provider for further evaluation.     1.  When was the last injection?  11/09/2021    2.  Has insurance for this injection been verified?  Yes    3.  Did you experience any new onset achiness or rashes that lasted for over a month with your previous Prolia injection?   No    4.  Do you have a fever over 101?F or a new deep cough that is unusual for you today? No    5.  Have you started any new medications in the last 6 months that you were told could affect your immune system? These may have been prescribed by oncologist, transplant, rheumatology, or dermatology.   No    6.  In the last 6 months have you have gastric bypass or parathyroid surgery?   No    7.  Do you plan dental work requiring drilling into the bone such as implants/extractions or oral surgery in the next 2-3 months?   No    8. Do you have new insurance since the last injection?  No     9. Have you received the Covid-19 vaccine? No  If No - Proceed with Prolia injection  If Yes - Date of vaccination N/A. Will need to wait until 2 weeks after 2nd dose of Covid-19 vaccine before administering Prolia       Patient informed if symptoms discussed above present prior to their administration appointment, they are to notify clinic immediately.   The following steps were completed to comply with the REMS program for Prolia:  1. Ordering provider has previously reviewed information in the Medication Guide and Patient Counseling Chart, including the serious risks of Prolia  and the symptoms of each risk and have been advised to seek prompt medical attention if they have signs or symptoms of any of the serious risks.  2. Provided each patient a copy of the Medication Guide and Patient Brochure.  See MAR for administration details.   Indication: Prolia  " (denosumab) is a prescription medicine used to treat osteoporosis in patients who:   Are at high risk for fracture, meaning patients who have had a fracture related to osteoporosis, or who have multiple risk factors for fracture; Cannot use another osteoporosis medicine or other osteoporosis medicines did not work well.   The timeline for early/late injections would be 4 weeks early and any time after the 6 month marta. If a patient receives their injection late, then the subsequent injection would be 6 months from the date that they actually received the injection    Have the screening questions been asked prior to this administration? Yes    Sukhwinder Medrano

## 2022-07-07 DIAGNOSIS — Z92.29 PERSONAL HISTORY OF OTHER DRUG THERAPY: ICD-10-CM

## 2022-07-07 DIAGNOSIS — M81.0 SENILE OSTEOPOROSIS: Primary | ICD-10-CM

## 2022-11-10 ENCOUNTER — OFFICE VISIT (OUTPATIENT)
Dept: INTERNAL MEDICINE | Facility: CLINIC | Age: 87
End: 2022-11-10
Payer: COMMERCIAL

## 2022-11-10 VITALS
DIASTOLIC BLOOD PRESSURE: 70 MMHG | HEART RATE: 93 BPM | OXYGEN SATURATION: 98 % | WEIGHT: 111 LBS | SYSTOLIC BLOOD PRESSURE: 158 MMHG | BODY MASS INDEX: 20.97 KG/M2

## 2022-11-10 DIAGNOSIS — S72.145D CLOSED NONDISPLACED INTERTROCHANTERIC FRACTURE OF LEFT FEMUR WITH ROUTINE HEALING, SUBSEQUENT ENCOUNTER: ICD-10-CM

## 2022-11-10 DIAGNOSIS — M81.0 SENILE OSTEOPOROSIS: Primary | ICD-10-CM

## 2022-11-10 PROBLEM — D62 ACUTE BLOOD LOSS ANEMIA: Status: RESOLVED | Noted: 2017-03-25 | Resolved: 2022-11-10

## 2022-11-10 PROCEDURE — 96372 THER/PROPH/DIAG INJ SC/IM: CPT | Performed by: PHARMACIST

## 2022-11-10 PROCEDURE — 99213 OFFICE O/P EST LOW 20 MIN: CPT | Mod: 25 | Performed by: INTERNAL MEDICINE

## 2022-11-10 RX ORDER — DOXYCYCLINE HYCLATE 100 MG
50 TABLET ORAL 2 TIMES DAILY
COMMUNITY

## 2022-11-10 NOTE — PATIENT INSTRUCTIONS
Prolia 19th today.  Prolia 20th in 6 months with my nurse. I will see you in 1 year.    DXA in 5/2023.   Phone number to schedule [phone].    Daily calcium need is 1571-6854 mg a day from the diet and supplements.  Calcium citrate is easier to digest.  Vitamin D 2000 IU daily recommended.    Risk of rebound vertebral fractures is higher when Prolia suddenly stopped or dose was missed.      Prolia and Covid vaccine should be  for at least a week.

## 2022-11-10 NOTE — PROGRESS NOTES
(M81.0) Osteoporosis Senile  (primary encounter diagnosis)  Comment: On Prolia for 9 years, tolerating it well. She was on Fosamax prior Prolia. Due for DXA scan in May 2023.  Plan: DX Hip/Pelvis/Spine            (S72.145D) Closed nondisplaced intertrochanteric fracture of left femur with routine healing, subsequent encounter  Comment: 2016  Plan: stable    Patient was educated on safety of Prolia utilizing Patient Counseling Chart for Healthcare Providers, as outlined by the Prolia REMS progam.     Return in about 6 months (around 5/10/2023) for Prolia with CSS.    Patient Instructions   Prolia 19th today.  Prolia 20th in 6 months with my nurse. I will see you in 1 year.    DXA in 5/2023.   Phone number to schedule [phone].    Daily calcium need is 1600-6313 mg a day from the diet and supplements.  Calcium citrate is easier to digest.  Vitamin D 2000 IU daily recommended.    Risk of rebound vertebral fractures is higher when Prolia suddenly stopped or dose was missed.      Prolia and Covid vaccine should be  for at least a week.           BP (!) 158/70 (BP Location: Right arm, Patient Position: Sitting, Cuff Size: Adult Regular)   Pulse 93   Wt 50.3 kg (111 lb)   SpO2 98%   BMI 20.97 kg/m        Did you experience any problems with previous Prolia injection? no  Any medication change in the last 6 months? no  Did you take prednisone or other immunosupressant drugs in the last 6 months   (chemo, transplant, rheum, dermatology conditions)? no  Did you have any serious infection in the last 6 months?no  Any recent hospitalizations?no  Do you plan any dental work in the next 2-3 months?no  How much calcium do you take daily from the diet and supplements?1200 mg  How much vit D do you take daily? 2000 IU  Last DXA? 4/2021, Reviewed and discussed      Patient is here today for the 19th Prolia injection. Patient tolerated previous injections well.   We discussed calcium and vit D daily needs today.    I reviewed the lab results:   Ref Range & Units 3 mo ago Resulting Agency   Sodium 136 - 145 mmol/L 139  [facility]   Potassium 3.5 - 5.1 mmol/L 4.4  [facility]   Chloride 98 - 109 mmol/L 103  [facility]   CO2 20 - 29 mmol/L 27  [facility]   Anion Gap 7 - 16 mmol/L 9  [facility]   Calcium 8.4 - 10.4 mg/dL 9.4  [facility]   BUN 7 - 26 mg/dL 15  [facility]   Creatinine 0.55 - 1.02 mg/dL 0.73  [facility]   GFR, Estimated >60 mL/min/1.73m2 >60       Component      Latest Ref Rng & Units 11/3/2020   Vitamin D, Total (25-Hydroxy)      30.0 - 80.0 ng/mL 52.9       We discussed high risk of rebound vertebral fractures when Prolia suddenly stopped.    Next Prolia injection will be in 6 months.           This note has been dictated using voice recognition software. Any grammatical or context distortions are unintentional and inherent to the software      Patient Active Problem List   Diagnosis     Essential Hypercholesterolemia     Congenital Thrombocytopenia     Benign Essential Hypertension     Osteoporosis Senile     Hip fracture (H)     HTN (hypertension)     Dyslipidemia     Intertrochanteric fracture of left femur (H)     Closed compression fracture of L3 vertebra (H)       Current Outpatient Medications   Medication     calcium gluconate 500 MG tablet     cholecalciferol, vitamin D3, 1,000 unit tablet     doxycycline hyclate (VIBRA-TABS) 100 MG tablet     glucosamine-chondroitin 500-400 mg cap     lisinopril (PRINIVIL,ZESTRIL) 20 MG tablet     MULTIVITS W-FE,OTHER MIN/LUT (CENTRUM SILVER ULTRA WOMEN'S ORAL)     simvastatin (ZOCOR) 10 MG tablet     amLODIPine (NORVASC) 5 MG tablet     Current Facility-Administered Medications   Medication     denosumab (PROLIA) injection 60 mg     denosumab (PROLIA) injection 60 mg

## 2023-05-01 ENCOUNTER — ALLIED HEALTH/NURSE VISIT (OUTPATIENT)
Dept: FAMILY MEDICINE | Facility: CLINIC | Age: 88
End: 2023-05-01
Payer: COMMERCIAL

## 2023-05-01 ENCOUNTER — ANCILLARY PROCEDURE (OUTPATIENT)
Dept: BONE DENSITY | Facility: CLINIC | Age: 88
End: 2023-05-01
Payer: COMMERCIAL

## 2023-05-01 DIAGNOSIS — M81.0 SENILE OSTEOPOROSIS: ICD-10-CM

## 2023-05-01 DIAGNOSIS — Z78.0 POSTMENOPAUSAL STATUS: ICD-10-CM

## 2023-05-01 DIAGNOSIS — M81.0 OSTEOPOROSIS: Primary | ICD-10-CM

## 2023-05-01 PROCEDURE — 99207 PR NO CHARGE NURSE ONLY: CPT

## 2023-05-01 PROCEDURE — 77081 DXA BONE DENSITY APPENDICULR: CPT | Mod: TC | Performed by: RADIOLOGY

## 2023-05-01 PROCEDURE — 77080 DXA BONE DENSITY AXIAL: CPT | Mod: TC | Performed by: RADIOLOGY

## 2023-05-01 NOTE — PROGRESS NOTES
Pt came in for Prolia, but is a week early per Insurance.   Would like to hold off until it has been at least 6 months.     Pt rescheduled for next Wednesday: 5/10/2023.  Appointments in Next Year       May 10, 2023 11:20 AM  Nurse Visit with [facility] RN  [facility] ([facility] ) [phone]        Emily Rowe RN, BSN  [facility]

## 2023-05-10 ENCOUNTER — ALLIED HEALTH/NURSE VISIT (OUTPATIENT)
Dept: FAMILY MEDICINE | Facility: CLINIC | Age: 88
End: 2023-05-10
Payer: COMMERCIAL

## 2023-05-10 DIAGNOSIS — M81.0 SENILE OSTEOPOROSIS: Primary | ICD-10-CM

## 2023-05-10 PROCEDURE — 99207 PR NO CHARGE NURSE ONLY: CPT

## 2023-05-10 PROCEDURE — 96372 THER/PROPH/DIAG INJ SC/IM: CPT | Performed by: INTERNAL MEDICINE

## 2023-05-10 NOTE — PROGRESS NOTES
Indication: Prolia  (denosumab) is a prescription medicine used to treat osteoporosis in patients who:     Are at high risk for fracture, meaning patients who have had a fracture related to osteoporosis, or who have multiple risk factors for fracture     Cannot use another osteoporosis medicine or other osteoporosis medicines did not work well   The timeline for early/late injections would be 4 weeks early and any time after the 6 month marta. If a patient receives their injection late, then the subsequent injection would be 6 months from the date that they actually received the injection    1.  When was the last injection?  11/10/22  2.  Did they check with their insurance for this calendar year?  Yes  3.  Is there an order in the chart?  Yes  4.  Has the patient had dental work involving the bone in the past month or will have work in the next 6 months?  No  5.  Did you have the patient wait 15 minutes after the injection?  Yes  6.  Remember to use .injection under the medication notes    The following steps were completed to comply with the REMS program for Prolia:    Reviewed information in the Medication Guide and Patient Counseling Chart, including the serious risks of Prolia  and the symptoms of each risk.    Advised patient to seek prompt medical attention if they have signs or symptoms of any of the serious risks.  Provided each patient a copy of the Medication Guide and Patient Brochure.      Clinic Administered Medication Documentation      Prolia Documentation    Indication: Prolia  (denosumab) is a prescription medicine used to treat osteoporosis in patients who:     Are at high risk for fracture, meaning patients who have had a fracture related to osteoporosis, or who have multiple risk factors for fracture.    Cannot use another osteoporosis medicine or other osteoporosis medicines did not work well.    The timeline for early/late injections would be 4 weeks early and any time after the 6 month marta.  If a patient receives their injection late, then the subsequent injection would be 6 months from the date that they actually received the injection.    When was the last injection?  11/10/22  Was the last injection at least 6 months ago? Yes  Has the prior authorization been completed?  Yes  Is there an active order (written within the past 365 days, with administrations remaining, not ) in the chart?  Yes  Patient denies any dental work involving the bone (e.g. tooth extraction or dental implants) in the past 4 weeks?  Yes  Patient denies plans for any dental work involving the bone (e.g. tooth extraction or dental implants) in the next 4 weeks? Yes    The following steps were completed to comply with the REMS program for Prolia:    Reviewed information in the Medication Guide and Patient Counseling Chart, including the serious risks of Prolia  and the symptoms of each risk.    Advised patient to seek prompt medical attention if they have signs or symptoms of any of the serious risks.    Provided each patient a copy of the Medication Guide and Patient Brochure.      Prior to injection, verified patient identity using patient's name and date of birth. Medication was administered. Please see MAR and medication order for additional information. Patient instructed to remain in clinic for 15 minutes and report any adverse reaction to staff immediately.    Vial/Syringe: Single dose vial. Was entire vial of medication used? Yes  Was this medication supplied by the patient? No  Patient has no refills remaining. Refill encounter opened, order pended and Routed to the provider

## 2023-06-13 DIAGNOSIS — Z92.29 PERSONAL HISTORY OF OTHER DRUG THERAPY: ICD-10-CM

## 2023-06-13 DIAGNOSIS — M81.0 SENILE OSTEOPOROSIS: Primary | ICD-10-CM

## 2023-11-10 ENCOUNTER — OFFICE VISIT (OUTPATIENT)
Dept: INTERNAL MEDICINE | Facility: CLINIC | Age: 88
End: 2023-11-10
Payer: COMMERCIAL

## 2023-11-10 VITALS
OXYGEN SATURATION: 96 % | BODY MASS INDEX: 20.96 KG/M2 | SYSTOLIC BLOOD PRESSURE: 137 MMHG | DIASTOLIC BLOOD PRESSURE: 71 MMHG | HEART RATE: 85 BPM | HEIGHT: 61 IN | RESPIRATION RATE: 16 BRPM | WEIGHT: 111 LBS

## 2023-11-10 DIAGNOSIS — M81.0 SENILE OSTEOPOROSIS: Primary | ICD-10-CM

## 2023-11-10 DIAGNOSIS — S72.145D CLOSED NONDISPLACED INTERTROCHANTERIC FRACTURE OF LEFT FEMUR WITH ROUTINE HEALING, SUBSEQUENT ENCOUNTER: ICD-10-CM

## 2023-11-10 DIAGNOSIS — S32.030D CLOSED COMPRESSION FRACTURE OF L3 LUMBAR VERTEBRA WITH ROUTINE HEALING, SUBSEQUENT ENCOUNTER: ICD-10-CM

## 2023-11-10 PROCEDURE — 99213 OFFICE O/P EST LOW 20 MIN: CPT | Mod: 25 | Performed by: INTERNAL MEDICINE

## 2023-11-10 PROCEDURE — 96372 THER/PROPH/DIAG INJ SC/IM: CPT | Performed by: PHARMACIST

## 2023-11-10 NOTE — PATIENT INSTRUCTIONS
Prolia 21st today.  Prolia 22nd in 6 months with my nurse. I will see you in 1 year.    DXA in 5/2025 .   Phone number to schedule [phone].    Daily calcium need is 1913-7050 mg a day from the diet and supplements.  Calcium citrate is easier to digest.  Vitamin D 2000 IU daily recommended.    Risk of rebound vertebral fractures is higher when Prolia suddenly stopped or dose was missed.      Prolia and Covid vaccine should be  for at least a week.     For rosacea, you can try Metrogel and Finacea. You can discuss with your Dermatology.

## 2023-11-10 NOTE — PROGRESS NOTES
"  (M81.0) Osteoporosis Senile  (primary encounter diagnosis)    (S72.215D) Closed nondisplaced intertrochanteric fracture of left femur with routine healing, subsequent encounter    (S32.030D) Closed compression fracture of L3 lumbar vertebra with routine healing, subsequent encounter    On Prolia treatment for 10 years, tolerating it well.  We reviewed the DXA scan result from 5/2023.  I reviewed her labs from 7/27/23.    Considering reported general safety on long treatment with Prolia, more than 10 years, I recommended continuing Prolia treatment. Risk of rebound fractures and decline in bone density is reported with Prolia discontinuation and even with switching to bisphosphonate.     Patient was educated on safety of Prolia utilizing Patient Counseling Chart for Healthcare Providers, as outlined by the Prolia REMS progam.     Return in about 6 months (around 5/10/2024) for Prolia with CSS.    Patient Instructions   Prolia 21st today.  Prolia 22nd in 6 months with my nurse. I will see you in 1 year.    DXA in 5/2025 .   Phone number to schedule [phone].    Daily calcium need is 4454-6691 mg a day from the diet and supplements.  Calcium citrate is easier to digest.  Vitamin D 2000 IU daily recommended.    Risk of rebound vertebral fractures is higher when Prolia suddenly stopped or dose was missed.      Prolia and Covid vaccine should be  for at least a week.     For rosacea, you can try Metrogel and Finacea. You can discuss with your Dermatology.          /71   Pulse 85   Resp 16   Ht 1.549 m (5' 1\")   Wt 50.3 kg (111 lb)   LMP  (LMP Unknown)   SpO2 96%   BMI 20.97 kg/m        Did you experience any problems with previous Prolia injection? no  Any medication change in the last 6 months? no  Did you take prednisone or other immunosupressant drugs in the last 6 months   (chemo, transplant, rheum, dermatology conditions)? no  Did you have any serious infection in the last 6 months?no  Any " recent hospitalizations?no  Do you plan any dental work in the next 2-3 months?no  How much calcium do you take daily from the diet and supplements?1200 mg  How much vit D do you take daily? 2000 IU  Last DXA? 5/2023  Reviewed and discussed      Patient is here today for the Prolia injection. Patient tolerated previous injections well.   We discussed calcium and vit D daily needs today.       We discussed high risk of rebound vertebral fractures when Prolia suddenly stopped.    Next Prolia injection will be in 6 months.           This note has been dictated using voice recognition software. Any grammatical or context distortions are unintentional and inherent to the software      Patient Active Problem List   Diagnosis    Essential Hypercholesterolemia    Congenital Thrombocytopenia    Benign Essential Hypertension    Osteoporosis Senile    HTN (hypertension)    Dyslipidemia    Intertrochanteric fracture of left femur (H)    Closed compression fracture of L3 vertebra (H)       Current Outpatient Medications   Medication    calcium gluconate 500 MG tablet    cholecalciferol, vitamin D3, 1,000 unit tablet    doxycycline hyclate (VIBRA-TABS) 100 MG tablet    glucosamine-chondroitin 500-400 mg cap    lisinopril (PRINIVIL,ZESTRIL) 20 MG tablet    MULTIVITS W-FE,OTHER MIN/LUT (CENTRUM SILVER ULTRA WOMEN'S ORAL)    simvastatin (ZOCOR) 10 MG tablet    amLODIPine (NORVASC) 5 MG tablet     Current Facility-Administered Medications   Medication    denosumab (PROLIA) injection 60 mg    denosumab (PROLIA) injection 60 mg     Answers submitted by the patient for this visit:  General Questionnaire (Submitted on 11/10/2023)  Chief Complaint: Chronic problems general questions HPI Form  What is the reason for your visit today? : Osteoporosis  How many servings of fruits and vegetables do you eat daily?: 2-3  On average, how many sweetened beverages do you drink each day (Examples: soda, juice, sweet tea, etc.  Do NOT count diet or  artificially sweetened beverages)?: 0  How many minutes a day do you exercise enough to make your heart beat faster?: 9 or less  How many days a week do you exercise enough to make your heart beat faster?: 3 or less  How many days per week do you miss taking your medication?: 0

## 2024-05-10 ENCOUNTER — ALLIED HEALTH/NURSE VISIT (OUTPATIENT)
Dept: FAMILY MEDICINE | Facility: CLINIC | Age: 89
End: 2024-05-10
Payer: COMMERCIAL

## 2024-05-10 DIAGNOSIS — M81.0 SENILE OSTEOPOROSIS: Primary | ICD-10-CM

## 2024-05-10 PROCEDURE — 96372 THER/PROPH/DIAG INJ SC/IM: CPT | Performed by: PHARMACIST

## 2024-05-10 PROCEDURE — 99207 PR NO CHARGE NURSE ONLY: CPT

## 2024-05-10 NOTE — PROGRESS NOTES
Indication: Prolia  (denosumab) is a prescription medicine used to treat osteoporosis in patients who:   Are at high risk for fracture, meaning patients who have had a fracture related to osteoporosis, or who have multiple risk factors for fracture   Cannot use another osteoporosis medicine or other osteoporosis medicines did not work well   The timeline for early/late injections would be 4 weeks early and any time after the 6 month marta. If a patient receives their injection late, then the subsequent injection would be 6 months from the date that they actually received the injection    1.  When was the last injection?  11/10/23  2.  Did they check with their insurance for this calendar year?    3.  Is there an order in the chart?  Yes   4.  Has the patient had dental work involving the bone in the past month or will have work in the next 6 months?  No   5.  Did you have the patient wait 15 minutes after the injection?  Yes   6.  Remember to use .injection under the medication notes    The following steps were completed to comply with the REMS program for Prolia:  Reviewed information in the Medication Guide and Patient Counseling Chart, including the serious risks of Prolia  and the symptoms of each risk.  Advised patient to seek prompt medical attention if they have signs or symptoms of any of the serious risks.  Provided each patient a copy of the Medication Guide and Patient Brochure.     Clinic Administered Medication Documentation      Prolia Documentation    Indication: Prolia  (denosumab) is a prescription medicine used to treat osteoporosis in patients who:   Are at high risk for fracture, meaning patients who have had a fracture related to osteoporosis, or who have multiple risk factors for fracture.  Cannot use another osteoporosis medicine or other osteoporosis medicines did not work well.  The timeline for early/late injections would be 4 weeks early and any time after the 6 month marta. If a patient  receives their injection late, then the subsequent injection would be 6 months from the date that they actually received the injection.    When was the last injection?  11/10/23  Was the last injection at least 6 months ago? Yes  Has the prior authorization been completed?  Yes  Is there an active order (written within the past 365 days, with administrations remaining, not ) in the chart?  Yes  Patient denies any dental work involving the bone (e.g. tooth extraction or dental implants) in the past 4 weeks?  Yes  Patient denies plans for any dental work involving the bone (e.g. tooth extraction or dental implants) in the next 4 weeks? Yes    The following steps were completed to comply with the REMS program for Prolia:  Reviewed information in the Medication Guide and Patient Counseling Chart, including the serious risks of Prolia  and the symptoms of each risk.  Advised patient to seek prompt medical attention if they have signs or symptoms of any of the serious risks.  Provided each patient a copy of the Medication Guide and Patient Brochure.    Prior to injection, verified patient identity using patient's name and date of birth. Medication was administered. Please see MAR and medication order for additional information. Patient instructed to remain in clinic for 15 minutes and report any adverse reaction to staff immediately.    Vial/Syringe: Syringe  Was this medication supplied by the patient? No  Verified that the patient has refills remaining in their prescription.     Adi RADFORD RN

## 2024-10-23 DIAGNOSIS — M81.0 SENILE OSTEOPOROSIS: Primary | ICD-10-CM

## 2024-10-23 DIAGNOSIS — Z92.29 PERSONAL HISTORY OF OTHER DRUG THERAPY: ICD-10-CM

## 2024-11-12 ENCOUNTER — ALLIED HEALTH/NURSE VISIT (OUTPATIENT)
Dept: FAMILY MEDICINE | Facility: CLINIC | Age: 89
End: 2024-11-12
Payer: COMMERCIAL

## 2024-11-12 DIAGNOSIS — M81.0 SENILE OSTEOPOROSIS: Primary | ICD-10-CM

## 2024-11-12 PROCEDURE — 96372 THER/PROPH/DIAG INJ SC/IM: CPT | Performed by: INTERNAL MEDICINE

## 2024-11-12 PROCEDURE — 99207 PR NO CHARGE NURSE ONLY: CPT

## 2024-11-12 NOTE — PROGRESS NOTES
Clinic Administered Medication Documentation      Prolia Documentation    Indication: Prolia  (denosumab) is a prescription medicine used to treat osteoporosis in patients who:   Are at high risk for fracture, meaning patients who have had a fracture related to osteoporosis, or who have multiple risk factors for fracture.  Cannot use another osteoporosis medicine or other osteoporosis medicines did not work well.  The timeline for early/late injections would be 4 weeks early and any time after the 6 month marta. If a patient receives their injection late, then the subsequent injection would be 6 months from the date that they actually received the injection.    When was the last injection?  5/10/2024  Was the last injection at least 6 months ago? Yes  Has the prior authorization been completed?  Yes  Is there an active order (written within the past 365 days, with administrations remaining, not ) in the chart?  Yes   GFR Estimate   Date Value Ref Range Status   2016 >60 >60 mL/min/1.73m2 Final     Has patient had a GFR within the last 12 months? Yes   Is GFR under 30, or patient has a diagnosis of CKD4 or CKD5? No   Patient denies gastric bypass or parathyroid surgery in past 6 months? Yes - patient denies.   Patient denies dental work in the past two months involving drilling into the bone, such as implants/extractions, oral surgery or a tooth extraction that has not healed yet?  Yes  Patient denies plans for an emergency tooth extraction within the next week? Yes    The following steps were completed to comply with the REMS program for Prolia:  Reviewed information in the Medication Guide, including the serious risks of Prolia  and the symptoms of each risk.  Advised patient to seek prompt medical attention if they have signs or symptoms of any of the serious risks.  Provided each patient a copy of the Medication Guide and Patient Guide.    Prior to injection, verified patient identity using patient's  name and date of birth. Medication was administered. Please see MAR and medication order for additional information. Patient instructed to remain in clinic for 15 minutes and report any adverse reaction to staff immediately.    Vial/Syringe: Syringe  Was this medication supplied by the patient? No  Verified that the patient has refills remaining in their prescription.

## 2025-04-23 DIAGNOSIS — Z92.29 PERSONAL HISTORY OF OTHER DRUG THERAPY: ICD-10-CM

## 2025-04-23 DIAGNOSIS — M81.0 SENILE OSTEOPOROSIS: Primary | ICD-10-CM

## 2025-05-13 ENCOUNTER — ANCILLARY PROCEDURE (OUTPATIENT)
Dept: BONE DENSITY | Facility: CLINIC | Age: OVER 89
End: 2025-05-13
Attending: INTERNAL MEDICINE
Payer: COMMERCIAL

## 2025-05-13 ENCOUNTER — OFFICE VISIT (OUTPATIENT)
Dept: INTERNAL MEDICINE | Facility: CLINIC | Age: OVER 89
End: 2025-05-13
Payer: COMMERCIAL

## 2025-05-13 ENCOUNTER — RESULTS FOLLOW-UP (OUTPATIENT)
Dept: INTERNAL MEDICINE | Facility: CLINIC | Age: OVER 89
End: 2025-05-13

## 2025-05-13 VITALS
OXYGEN SATURATION: 98 % | WEIGHT: 115 LBS | DIASTOLIC BLOOD PRESSURE: 70 MMHG | HEIGHT: 61 IN | HEART RATE: 90 BPM | TEMPERATURE: 97.6 F | BODY MASS INDEX: 21.71 KG/M2 | SYSTOLIC BLOOD PRESSURE: 136 MMHG | RESPIRATION RATE: 16 BRPM

## 2025-05-13 DIAGNOSIS — M81.0 SENILE OSTEOPOROSIS: ICD-10-CM

## 2025-05-13 DIAGNOSIS — S32.030D CLOSED COMPRESSION FRACTURE OF L3 LUMBAR VERTEBRA WITH ROUTINE HEALING, SUBSEQUENT ENCOUNTER: ICD-10-CM

## 2025-05-13 DIAGNOSIS — I10 ESSENTIAL HYPERTENSION, BENIGN: ICD-10-CM

## 2025-05-13 DIAGNOSIS — M81.0 SENILE OSTEOPOROSIS: Primary | ICD-10-CM

## 2025-05-13 DIAGNOSIS — S72.145D CLOSED NONDISPLACED INTERTROCHANTERIC FRACTURE OF LEFT FEMUR WITH ROUTINE HEALING, SUBSEQUENT ENCOUNTER: ICD-10-CM

## 2025-05-13 LAB
ANION GAP SERPL CALCULATED.3IONS-SCNC: 9 MMOL/L (ref 7–15)
BUN SERPL-MCNC: 19.1 MG/DL (ref 8–23)
CALCIUM SERPL-MCNC: 9.6 MG/DL (ref 8.8–10.4)
CHLORIDE SERPL-SCNC: 101 MMOL/L (ref 98–107)
CREAT SERPL-MCNC: 0.77 MG/DL (ref 0.51–0.95)
EGFRCR SERPLBLD CKD-EPI 2021: 73 ML/MIN/1.73M2
GLUCOSE SERPL-MCNC: 105 MG/DL (ref 70–99)
HCO3 SERPL-SCNC: 28 MMOL/L (ref 22–29)
POTASSIUM SERPL-SCNC: 4.1 MMOL/L (ref 3.4–5.3)
SODIUM SERPL-SCNC: 138 MMOL/L (ref 135–145)
VIT D+METAB SERPL-MCNC: 75 NG/ML (ref 20–50)

## 2025-05-13 PROCEDURE — 77080 DXA BONE DENSITY AXIAL: CPT | Mod: TC | Performed by: PHYSICIAN ASSISTANT

## 2025-05-13 PROCEDURE — 82306 VITAMIN D 25 HYDROXY: CPT | Performed by: INTERNAL MEDICINE

## 2025-05-13 PROCEDURE — 77081 DXA BONE DENSITY APPENDICULR: CPT | Mod: TC | Performed by: PHYSICIAN ASSISTANT

## 2025-05-13 PROCEDURE — 3075F SYST BP GE 130 - 139MM HG: CPT | Performed by: INTERNAL MEDICINE

## 2025-05-13 PROCEDURE — 96372 THER/PROPH/DIAG INJ SC/IM: CPT | Performed by: PHARMACIST

## 2025-05-13 PROCEDURE — 99214 OFFICE O/P EST MOD 30 MIN: CPT | Mod: 25 | Performed by: INTERNAL MEDICINE

## 2025-05-13 PROCEDURE — 36415 COLL VENOUS BLD VENIPUNCTURE: CPT | Performed by: INTERNAL MEDICINE

## 2025-05-13 PROCEDURE — 3078F DIAST BP <80 MM HG: CPT | Performed by: INTERNAL MEDICINE

## 2025-05-13 PROCEDURE — 80048 BASIC METABOLIC PNL TOTAL CA: CPT | Performed by: INTERNAL MEDICINE

## 2025-05-13 RX ORDER — NYSTATIN 100000 [USP'U]/G
POWDER TOPICAL
COMMUNITY
Start: 2024-10-30

## 2025-05-13 RX ORDER — ASPIRIN 81 MG/1
81 TABLET, COATED ORAL
COMMUNITY
Start: 2025-01-02

## 2025-05-13 RX ORDER — LISINOPRIL 10 MG/1
TABLET ORAL
COMMUNITY
Start: 2025-02-17

## 2025-05-13 RX ORDER — DOXYCYCLINE HYCLATE 50 MG/1
CAPSULE ORAL
COMMUNITY
Start: 2025-04-14

## 2025-05-13 NOTE — PATIENT INSTRUCTIONS
Prolia 24th today.  Prolia 25th in 6 months with my nurse. I will see you in 1 year.    DXA due now .   Phone number to schedule [phone].    Daily calcium need is 6614-8313 mg a day from the diet and supplements.  Calcium citrate is easier to digest.  Vitamin D 2000 IU daily recommended.    Risk of rebound vertebral fractures is higher when Prolia suddenly stopped or dose was missed.      Prolia and Covid vaccine should be  for at least a week.    Patient education:  Patients advised to maintain good oral hygiene during treatment, because of the risk for osteonecrosis of the jaw.   The risk of osteonecrosis of the jaw with Prolia therapy is extremely low.   If a patient is late for Prolia therapy (>3 wks) a rapid rebound of bone loss can occur which is highly concerning and important to try to prevent to optimize bone health.   Therefore if an invasive dental procedure is required, primarily extraction or implant, while on Prolia therapy, the recommendation is to time the dental procedure optimally 4 months after the most recent dose of Prolia allowing 6-8 weeks for healing prior to next dose of Prolia.   This is based on the updated 2022 Recommendations from the [employer] Association of Oral [employer] Maxill[employer].   We also recommend discussing with dentist or oral surgeon if pretreatment prophylactic oral rinses and antibiotics would be beneficial.   Optimizing oral hygiene before any invasive dental procedure significantly lowers ONJ risk.     There is a greater risk of severe hypocalcemia following denosumab administration and patient is advised that we will have to monitor calcium level.  Risk of infection is increased with denosumab use, so patient will inform me if has more frequent infections.     Atypical femur fracture  has been reported in patients receiving denosumab. The fractures may occur anywhere along the femoral shaft (may be bilateral) and commonly occur with minimal to no trauma  to the area. Some patients experience prodromal thigh or groin pain weeks or months before the fracture occurs. Contralateral limb should be assessed if AFF occurs.     Multiple vertebral column fracture has been reported following discontinuation of therapy. Hypertension and increased cholesterol were reported with denosumab use.      Discussed 10-year data of Prolia. Discussed the importance of being on time and consistent with Prolia use to prevent rapid bone of osteoclastic activity.  Discussed that if Prolia is discontinued we will likely need to utilize an antiresorptive, such as Reclast, to help prevent rapid rebound of osteoclast activity. Often more than 1 dose is required.  Labs yearly to ensure calcium and vitamin D optimized while patient on Prolia.

## 2025-05-13 NOTE — PROGRESS NOTES
Patient is here today for the Prolia injection. Patient tolerated previous injections well. No recent falls, new fractures, medication change, hospitalizations or surgeries. We discussed calcium and vit D daily needs today.   We discussed high risk of rebound vertebral fractures when Prolia suddenly stopped.      (M81.0) Osteoporosis Senile  (primary encounter diagnosis)  (S32.030D) Closed compression fracture of L3 lumbar vertebra with routine healing, subsequent encounter  (S72.145D) Closed nondisplaced intertrochanteric fracture of left femur with routine healing, subsequent encounter      On Prolia treatment for 12 years, tolerating it well.  We reviewed the DXA scan result from 5/2023.  I reviewed her labs from 10/2024.    Considering reported general safety on long treatment with Prolia, more than 10 years, I recommended continuing Prolia treatment. Risk of rebound fractures and decline in bone density is reported with Prolia discontinuation and even with switching to bisphosphonate.     Plan: DX Bone Density, Vitamin D Deficiency, Basic         metabolic panel               (I10) Essential hypertension, benign  Stable on lisinopril.    Patient was educated on safety of Prolia utilizing Patient Counseling Chart for Healthcare Providers, as outlined by the Prolia REMS progam.     Return in about 6 months (around 11/13/2025) for Prolia with CSS.    Patient Instructions   Prolia 24th today.  Prolia 25th in 6 months with my nurse. I will see you in 1 year.    DXA due now .   Phone number to schedule [phone].    Daily calcium need is 9702-6710 mg a day from the diet and supplements.  Calcium citrate is easier to digest.  Vitamin D 2000 IU daily recommended.    Risk of rebound vertebral fractures is higher when Prolia suddenly stopped or dose was missed.      Prolia and Covid vaccine should be  for at least a week.    Patient education:  Patients advised to maintain good oral hygiene during treatment,  because of the risk for osteonecrosis of the jaw.   The risk of osteonecrosis of the jaw with Prolia therapy is extremely low.   If a patient is late for Prolia therapy (>3 wks) a rapid rebound of bone loss can occur which is highly concerning and important to try to prevent to optimize bone health.   Therefore if an invasive dental procedure is required, primarily extraction or implant, while on Prolia therapy, the recommendation is to time the dental procedure optimally 4 months after the most recent dose of Prolia allowing 6-8 weeks for healing prior to next dose of Prolia.   This is based on the updated 2022 Recommendations from the [employer] Association of Oral [employer] Maxill[employer]acial Surgeons.   We also recommend discussing with dentist or oral surgeon if pretreatment prophylactic oral rinses and antibiotics would be beneficial.   Optimizing oral hygiene before any invasive dental procedure significantly lowers ONJ risk.     There is a greater risk of severe hypocalcemia following denosumab administration and patient is advised that we will have to monitor calcium level.  Risk of infection is increased with denosumab use, so patient will inform me if has more frequent infections.     Atypical femur fracture  has been reported in patients receiving denosumab. The fractures may occur anywhere along the femoral shaft (may be bilateral) and commonly occur with minimal to no trauma to the area. Some patients experience prodromal thigh or groin pain weeks or months before the fracture occurs. Contralateral limb should be assessed if AFF occurs.     Multiple vertebral column fracture has been reported following discontinuation of therapy. Hypertension and increased cholesterol were reported with denosumab use.      Discussed 10-year data of Prolia. Discussed the importance of being on time and consistent with Prolia use to prevent rapid bone of osteoclastic activity.  Discussed that if Prolia is discontinued we will likely  "need to utilize an antiresorptive, such as Reclast, to help prevent rapid rebound of osteoclast activity. Often more than 1 dose is required.  Labs yearly to ensure calcium and vitamin D optimized while patient on Prolia.              /70 (BP Location: Right arm, Patient Position: Sitting)   Pulse 90   Temp 97.6  F (36.4  C)   Resp 16   Ht 1.549 m (5' 1\")   Wt 52.2 kg (115 lb)   LMP  (LMP Unknown)   SpO2 98%   BMI 21.73 kg/m        Did you experience any problems with previous Prolia injection? no  Any medication change in the last 6 months? no  Did you take prednisone or other immunosupressant drugs in the last 6 months   (chemo, transplant, rheum, dermatology conditions)? no  Did you have any serious infection in the last 6 months?no  Any recent hospitalizations?no  Do you plan any dental work in the next 2-3 months?no  How much calcium do you take daily from the diet and supplements?1200 mg  How much vit D do you take daily? 2000 IU  Last DXA? 5/2023 Reviewed and discussed            This note has been dictated using voice recognition software. Any grammatical or context distortions are unintentional and inherent to the software      Patient Active Problem List   Diagnosis    Essential Hypercholesterolemia    Congenital Thrombocytopenia    Benign Essential Hypertension    Osteoporosis Senile    HTN (hypertension)    Intertrochanteric fracture of left femur (H)    Closed compression fracture of L3 vertebra (H)       Current Outpatient Medications   Medication Sig Dispense Refill    amLODIPine (NORVASC) 5 MG tablet [AMLODIPINE (NORVASC) 5 MG TABLET] Take 5 mg by mouth.      aspirin (ASA) 81 MG EC tablet Take 81 mg by mouth.      calcium gluconate 500 MG tablet Take 500 mg by mouth 2 times daily      cholecalciferol, vitamin D3, 1,000 unit tablet [CHOLECALCIFEROL, VITAMIN D3, 1,000 UNIT TABLET] Take 2,000 Units by mouth daily.       doxycycline hyclate (VIBRA-TABS) 100 MG tablet Take 50 mg by mouth 2 " times daily      doxycycline hyclate (VIBRAMYCIN) 50 MG capsule TAKE 1 CAPSULE BY MOUTH AT BEDTIME WITH FOOD AND WATER. DO NOT LIE DOWN AFTER TAKING WAIT 30 MINUTES.      glucosamine-chondroitin 500-400 mg cap [GLUCOSAMINE-CHONDROITIN 500-400 MG CAP] Take 1 capsule by mouth daily.      lisinopril (ZESTRIL) 10 MG tablet       MULTIVITS W-FE,OTHER MIN/LUT (CENTRUM SILVER ULTRA WOMEN'S ORAL) [MULTIVITS W-FE,OTHER MIN/LUT (CENTRUM SILVER ULTRA WOMEN'S ORAL)] Take 1 tablet by mouth daily.      nystatin (MYCOSTATIN) 859495 UNIT/GM external powder       simvastatin (ZOCOR) 10 MG tablet [SIMVASTATIN (ZOCOR) 10 MG TABLET] Take 10 mg by mouth bedtime.        Current Facility-Administered Medications   Medication Dose Route Frequency Provider Last Rate Last Admin    denosumab (PROLIA) injection 60 mg  60 mg Subcutaneous Q6 Months         denosumab (PROLIA) injection 60 mg  60 mg Subcutaneous Q6 Months    60 mg at 11/12/24 0951    denosumab (PROLIA) injection 60 mg  60 mg Subcutaneous Q6 Months Griselda Quiñones, PharmD   60 mg at 05/13/25 0944

## 2025-06-24 ENCOUNTER — TRANSFERRED RECORDS (OUTPATIENT)
Dept: HEALTH INFORMATION MANAGEMENT | Facility: CLINIC | Age: OVER 89
End: 2025-06-24
Payer: COMMERCIAL